# Patient Record
Sex: MALE | Race: WHITE | Employment: FULL TIME | URBAN - METROPOLITAN AREA
[De-identification: names, ages, dates, MRNs, and addresses within clinical notes are randomized per-mention and may not be internally consistent; named-entity substitution may affect disease eponyms.]

---

## 2018-12-18 ENCOUNTER — TELEPHONE (OUTPATIENT)
Dept: OTHER | Facility: CLINIC | Age: 28
End: 2018-12-18

## 2018-12-18 NOTE — TELEPHONE ENCOUNTER
12/18/2018    Call Regarding Onboarding P1 Other    Attempt 1    Message on voicemail     Comments:       Outreach   CWR

## 2019-03-19 ENCOUNTER — HOSPITAL ENCOUNTER (EMERGENCY)
Facility: CLINIC | Age: 29
Discharge: HOME | End: 2019-03-20
Attending: EMERGENCY MEDICINE | Admitting: EMERGENCY MEDICINE
Payer: COMMERCIAL

## 2019-03-19 DIAGNOSIS — R10.13 ABDOMINAL PAIN, EPIGASTRIC: ICD-10-CM

## 2019-03-19 LAB
ALBUMIN SERPL-MCNC: 4 G/DL (ref 3.4–5)
ALP SERPL-CCNC: 73 U/L (ref 40–150)
ALT SERPL W P-5'-P-CCNC: 32 U/L (ref 0–70)
ANION GAP SERPL CALCULATED.3IONS-SCNC: 10 MMOL/L (ref 3–14)
AST SERPL W P-5'-P-CCNC: 34 U/L (ref 0–45)
BILIRUB SERPL-MCNC: 0.4 MG/DL (ref 0.2–1.3)
BUN SERPL-MCNC: 13 MG/DL (ref 7–30)
CALCIUM SERPL-MCNC: 8.4 MG/DL (ref 8.5–10.1)
CHLORIDE SERPL-SCNC: 104 MMOL/L (ref 94–109)
CO2 SERPL-SCNC: 24 MMOL/L (ref 20–32)
CREAT SERPL-MCNC: 0.78 MG/DL (ref 0.66–1.25)
GFR SERPL CREATININE-BSD FRML MDRD: >90 ML/MIN/{1.73_M2}
GLUCOSE SERPL-MCNC: 88 MG/DL (ref 70–99)
LIPASE SERPL-CCNC: 77 U/L (ref 73–393)
POTASSIUM SERPL-SCNC: 3.8 MMOL/L (ref 3.4–5.3)
PROT SERPL-MCNC: 8 G/DL (ref 6.8–8.8)
SODIUM SERPL-SCNC: 138 MMOL/L (ref 133–144)

## 2019-03-19 PROCEDURE — 80053 COMPREHEN METABOLIC PANEL: CPT | Performed by: EMERGENCY MEDICINE

## 2019-03-19 PROCEDURE — 85025 COMPLETE CBC W/AUTO DIFF WBC: CPT | Performed by: EMERGENCY MEDICINE

## 2019-03-19 PROCEDURE — 83690 ASSAY OF LIPASE: CPT | Performed by: EMERGENCY MEDICINE

## 2019-03-19 PROCEDURE — 96374 THER/PROPH/DIAG INJ IV PUSH: CPT | Mod: 59 | Performed by: EMERGENCY MEDICINE

## 2019-03-19 PROCEDURE — 25000132 ZZH RX MED GY IP 250 OP 250 PS 637: Performed by: EMERGENCY MEDICINE

## 2019-03-19 PROCEDURE — 25000128 H RX IP 250 OP 636: Performed by: EMERGENCY MEDICINE

## 2019-03-19 PROCEDURE — 99285 EMERGENCY DEPT VISIT HI MDM: CPT | Mod: Z6 | Performed by: EMERGENCY MEDICINE

## 2019-03-19 PROCEDURE — 99285 EMERGENCY DEPT VISIT HI MDM: CPT | Mod: 25 | Performed by: EMERGENCY MEDICINE

## 2019-03-19 PROCEDURE — 25000125 ZZHC RX 250: Performed by: EMERGENCY MEDICINE

## 2019-03-19 RX ORDER — HYDROMORPHONE HYDROCHLORIDE 1 MG/ML
0.5 INJECTION, SOLUTION INTRAMUSCULAR; INTRAVENOUS; SUBCUTANEOUS
Status: COMPLETED | OUTPATIENT
Start: 2019-03-19 | End: 2019-03-19

## 2019-03-19 RX ORDER — ONDANSETRON 2 MG/ML
4 INJECTION INTRAMUSCULAR; INTRAVENOUS ONCE
Status: COMPLETED | OUTPATIENT
Start: 2019-03-19 | End: 2019-03-19

## 2019-03-19 RX ADMIN — ONDANSETRON 4 MG: 2 INJECTION INTRAMUSCULAR; INTRAVENOUS at 22:47

## 2019-03-19 RX ADMIN — LIDOCAINE HYDROCHLORIDE 30 ML: 20 SOLUTION ORAL; TOPICAL at 22:48

## 2019-03-19 RX ADMIN — Medication 0.5 MG: at 22:47

## 2019-03-19 SDOH — HEALTH STABILITY: MENTAL HEALTH: HOW OFTEN DO YOU HAVE A DRINK CONTAINING ALCOHOL?: NEVER

## 2019-03-19 ASSESSMENT — MIFFLIN-ST. JEOR: SCORE: 2070.76

## 2019-03-19 NOTE — ED AVS SNAPSHOT
[facility], [place], Emergency Department  [address] [place] [id number]  Phone:  [phone]                                    Rajan Schumacher   MRN: [id number]    Department:  [facility], Emergency Department   Date of Visit:  3/19/2019           After Visit Summary Signature Page    I have received my discharge instructions, and my questions have been answered. I have discussed any challenges I see with this plan with the nurse or doctor.    ..........................................................................................................................................  Patient/Patient Representative Signature      ..........................................................................................................................................  Patient Representative Print Name and Relationship to Patient    ..................................................               ................................................  Date                                   Time    ..........................................................................................................................................  Reviewed by Signature/Title    ...................................................              ..............................................  Date                                               Time          22EPIC Rev 08/18

## 2019-03-20 ENCOUNTER — APPOINTMENT (OUTPATIENT)
Dept: CT IMAGING | Facility: CLINIC | Age: 29
End: 2019-03-20
Attending: EMERGENCY MEDICINE
Payer: COMMERCIAL

## 2019-03-20 VITALS
OXYGEN SATURATION: 97 % | RESPIRATION RATE: 18 BRPM | SYSTOLIC BLOOD PRESSURE: 126 MMHG | HEART RATE: 61 BPM | WEIGHT: 240 LBS | HEIGHT: 71 IN | DIASTOLIC BLOOD PRESSURE: 90 MMHG | BODY MASS INDEX: 33.6 KG/M2 | TEMPERATURE: 97.7 F

## 2019-03-20 LAB
ALBUMIN UR-MCNC: NEGATIVE MG/DL
APPEARANCE UR: CLEAR
BASOPHILS # BLD AUTO: 0 10E9/L (ref 0–0.2)
BASOPHILS NFR BLD AUTO: 0.6 %
BILIRUB UR QL STRIP: NEGATIVE
COLOR UR AUTO: ABNORMAL
DIFFERENTIAL METHOD BLD: NORMAL
EOSINOPHIL # BLD AUTO: 0.2 10E9/L (ref 0–0.7)
EOSINOPHIL NFR BLD AUTO: 2.7 %
ERYTHROCYTE [DISTWIDTH] IN BLOOD BY AUTOMATED COUNT: 12 % (ref 10–15)
GLUCOSE UR STRIP-MCNC: NEGATIVE MG/DL
HCT VFR BLD AUTO: 44.3 % (ref 40–53)
HGB BLD-MCNC: 14.4 G/DL (ref 13.3–17.7)
HGB UR QL STRIP: NEGATIVE
IMM GRANULOCYTES # BLD: 0 10E9/L (ref 0–0.4)
IMM GRANULOCYTES NFR BLD: 0.1 %
KETONES UR STRIP-MCNC: 10 MG/DL
LEUKOCYTE ESTERASE UR QL STRIP: NEGATIVE
LYMPHOCYTES # BLD AUTO: 2.6 10E9/L (ref 0.8–5.3)
LYMPHOCYTES NFR BLD AUTO: 36.5 %
MCH RBC QN AUTO: 28.8 PG (ref 26.5–33)
MCHC RBC AUTO-ENTMCNC: 32.5 G/DL (ref 31.5–36.5)
MCV RBC AUTO: 89 FL (ref 78–100)
MONOCYTES # BLD AUTO: 0.7 10E9/L (ref 0–1.3)
MONOCYTES NFR BLD AUTO: 10.1 %
MUCOUS THREADS #/AREA URNS LPF: PRESENT /LPF
NEUTROPHILS # BLD AUTO: 3.6 10E9/L (ref 1.6–8.3)
NEUTROPHILS NFR BLD AUTO: 50 %
NITRATE UR QL: NEGATIVE
NRBC # BLD AUTO: 0 10*3/UL
NRBC BLD AUTO-RTO: 0 /100
PH UR STRIP: 6 PH (ref 5–7)
PLATELET # BLD AUTO: 227 10E9/L (ref 150–450)
RBC # BLD AUTO: 5 10E12/L (ref 4.4–5.9)
RBC #/AREA URNS AUTO: 1 /HPF (ref 0–2)
SOURCE: ABNORMAL
SP GR UR STRIP: 1.05 (ref 1–1.03)
SQUAMOUS #/AREA URNS AUTO: <1 /HPF (ref 0–1)
UROBILINOGEN UR STRIP-MCNC: NORMAL MG/DL (ref 0–2)
WBC # BLD AUTO: 7.1 10E9/L (ref 4–11)
WBC #/AREA URNS AUTO: <1 /HPF (ref 0–5)

## 2019-03-20 PROCEDURE — 74177 CT ABD & PELVIS W/CONTRAST: CPT

## 2019-03-20 PROCEDURE — 25000128 H RX IP 250 OP 636: Performed by: EMERGENCY MEDICINE

## 2019-03-20 PROCEDURE — 81001 URINALYSIS AUTO W/SCOPE: CPT | Performed by: EMERGENCY MEDICINE

## 2019-03-20 PROCEDURE — 96376 TX/PRO/DX INJ SAME DRUG ADON: CPT | Performed by: EMERGENCY MEDICINE

## 2019-03-20 PROCEDURE — 25000132 ZZH RX MED GY IP 250 OP 250 PS 637: Performed by: EMERGENCY MEDICINE

## 2019-03-20 PROCEDURE — 96375 TX/PRO/DX INJ NEW DRUG ADDON: CPT | Performed by: EMERGENCY MEDICINE

## 2019-03-20 PROCEDURE — 96361 HYDRATE IV INFUSION ADD-ON: CPT | Performed by: EMERGENCY MEDICINE

## 2019-03-20 PROCEDURE — 25000125 ZZHC RX 250: Performed by: EMERGENCY MEDICINE

## 2019-03-20 RX ORDER — HYDROMORPHONE HYDROCHLORIDE 1 MG/ML
0.5 INJECTION, SOLUTION INTRAMUSCULAR; INTRAVENOUS; SUBCUTANEOUS
Status: COMPLETED | OUTPATIENT
Start: 2019-03-20 | End: 2019-03-20

## 2019-03-20 RX ORDER — POLYETHYLENE GLYCOL 3350 17 G/17G
1 POWDER, FOR SOLUTION ORAL DAILY
Qty: 527 G | Refills: 0 | Status: SHIPPED | OUTPATIENT
Start: 2019-03-20 | End: 2019-04-03

## 2019-03-20 RX ORDER — OXYCODONE HYDROCHLORIDE 5 MG/1
5 TABLET ORAL ONCE
Status: COMPLETED | OUTPATIENT
Start: 2019-03-20 | End: 2019-03-20

## 2019-03-20 RX ORDER — IOPAMIDOL 755 MG/ML
136 INJECTION, SOLUTION INTRAVASCULAR ONCE
Status: COMPLETED | OUTPATIENT
Start: 2019-03-20 | End: 2019-03-20

## 2019-03-20 RX ADMIN — OXYCODONE HYDROCHLORIDE 5 MG: 5 TABLET ORAL at 02:51

## 2019-03-20 RX ADMIN — IOPAMIDOL 136 ML: 755 INJECTION, SOLUTION INTRAVENOUS at 02:01

## 2019-03-20 RX ADMIN — SODIUM CHLORIDE 1000 ML: 9 INJECTION, SOLUTION INTRAVENOUS at 01:00

## 2019-03-20 RX ADMIN — RANITIDINE HYDROCHLORIDE 50 MG: 25 INJECTION INTRAMUSCULAR; INTRAVENOUS at 02:51

## 2019-03-20 RX ADMIN — Medication 0.5 MG: at 00:37

## 2019-03-20 RX ADMIN — SODIUM CHLORIDE, PRESERVATIVE FREE 88 ML: 5 INJECTION INTRAVENOUS at 02:02

## 2019-03-20 NOTE — ED PROVIDER NOTES
[place] EMERGENCY DEPARTMENT ([facility])  3/19/19   History     Chief Complaint   Patient presents with     Abdominal Pain     The history is provided by the patient.     Rajan Schumacher is a 28 year old otherwise healthy male who presents to the Emergency Department for evaluation of epigastric abdominal pain.  The patient reports that his pain first started with mild cramping approximately 1 week ago and has been intermittent over that week.  Patient notes that he developed diarrhea yesterday and states that it has continued today.  The patient reports that today while at work, he accidentally struck his stomach against something and immediately his epigastric pain became more severe and constant.  He notes that he has also had some mild nausea, but denies any vomiting.  He also denies any urinary symptoms.  He states that he has been eating and drinking normally up until his pain became more severe today.  Patient reports that he only drinks alcohol occasionally and denies any alcohol use within the last few days.  Patient has not taken any medications for his symptoms prior to arrival.  He denies any past abdominal surgeries.    I have reviewed the Medications, Allergies, Past Medical and Surgical History, and Social History in the Epic system.    History reviewed. No pertinent past medical history.    History reviewed. No pertinent surgical history.    History reviewed. No pertinent family history.    Social History     Tobacco Use     Smoking status: Never Smoker     Smokeless tobacco: Never Used   Substance Use Topics     Alcohol use: No     Frequency: Never       Current Facility-Administered Medications   Medication     HYDROmorphone (PF) (DILAUDID) injection 0.5 mg     lidocaine VISCOUS (XYLOCAINE) 2 % 15 mL, alum & mag hydroxide-simethicone (MYLANTA ES/MAALOX  ES) 15 mL GI Cocktail     ondansetron (ZOFRAN) injection 4 mg     No current outpatient medications on file.      No Known  "Allergies      Review of Systems   ROS: 14 point ROS neg other than the symptoms noted above in the HPI.      Physical Exam   BP: (!) 145/92  Pulse: 72  Temp: 97.7  F (36.5  C)  Resp: 18  Height: 180.3 cm (5' 11\")  Weight: 108.9 kg (240 lb)  SpO2: 99 %      Physical Exam   GEN: Well appearing, non toxic, cooperative.   HEENT: The head is normocephalic and atraumatic. Pupils are equal round and reactive to light. Extraocular motions are intact. There is no facial swelling. The neck is nontender and supple.   CV: Regular rate and rhythm without murmurs rubs or gallops. 2+ radial pulses bilaterally.  PULM: Clear to auscultation bilaterally.  ABD: Soft, nontender, nondistended.   EXT: Full range of motion.  No edema.  NEURO: Cranial nerves II through XII are intact and symmetric. Bilateral upper and lower extremities grossly show full range of motion without any focal deficits.   SKIN: No rashes, ecchymosis, or lacerations  PSYCH: Calm and cooperative, interactive.       ED Course   10:16 PM  The patient was seen and examined by Lonny Ford MD in Room ED19.        Procedures             Critical Care time:  none         Results for orders placed or performed during the hospital encounter of 03/19/19   CT Abdomen Pelvis w Contrast    Narrative    EXAMINATION: CT ABDOMEN PELVIS W CONTRAST, 3/20/2019 2:12 AM    TECHNIQUE:  Helical CT images from the lung apices through the  symphysis pubis were obtained with contrast.  Coronal and sagittal  reformatted images were generated at a workstation for further  assessment.    Contrast: 136 ml isovue 370      COMPARISON: None.    HISTORY: Abd pain, gastroenteritis or colitis suspected; epigastric  tenderness    FINDINGS:    Lung bases: No consolidation. No pleural effusion or pneumothorax.    Liver: No suspicious liver lesions. Portal veins appear patent.  Periportal edema likely related to volume resuscitation.  Gallbladder: Tiny layering gallstones in the gallbladder neck as " well  as a tiny stone in the body of the gallbladder. No evidence for acute  cholecystitis. No evidence of acute cholecystitis.  Spleen: Normal size.  Pancreas: No suspicious pancreatic lesions. The pancreatic duct is not  dilated.  Adrenal glands: No adrenal nodules.  Kidneys: No hydronephrosis or obstructing renal stones.  Bladder / Pelvic organs: Unremarkable.  Bowel: No bowel wall thickening. The appendix is unremarkable.  Fecalization of the contents of the terminal ileum without evidence of  obstruction.  Lymph nodes: No retroperitoneal, mesenteric, or pelvic  lymphadenopathy.  Peritoneum / Retroperitoneum: No free fluid or air within the abdomen.  Vessels: No infrarenal aortic aneurysm.     Bones and soft tissues: No suspicious lesion in the bones. Left iliac  and right femoral bone islands.      Impression    IMPRESSION:   1. Fecalization of the contents of the terminal ileum without evidence  of obstruction. Findings may be seen with enteritis or other causes of  delayed bowel transit (small intestinal bacterial overgrowth).  2. Cholelithiasis without evidence of acute cholecystitis.      CBC with platelets differential   Result Value Ref Range    WBC 7.1 4.0 - 11.0 10e9/L    RBC Count 5.00 4.4 - 5.9 10e12/L    Hemoglobin 14.4 13.3 - 17.7 g/dL    Hematocrit 44.3 40.0 - 53.0 %    MCV 89 78 - 100 fl    MCH 28.8 26.5 - 33.0 pg    MCHC 32.5 31.5 - 36.5 g/dL    RDW 12.0 10.0 - 15.0 %    Platelet Count 227 150 - 450 10e9/L    Diff Method Automated Method     % Neutrophils 50.0 %    % Lymphocytes 36.5 %    % Monocytes 10.1 %    % Eosinophils 2.7 %    % Basophils 0.6 %    % Immature Granulocytes 0.1 %    Nucleated RBCs 0 0 /100    Absolute Neutrophil 3.6 1.6 - 8.3 10e9/L    Absolute Lymphocytes 2.6 0.8 - 5.3 10e9/L    Absolute Monocytes 0.7 0.0 - 1.3 10e9/L    Absolute Eosinophils 0.2 0.0 - 0.7 10e9/L    Absolute Basophils 0.0 0.0 - 0.2 10e9/L    Abs Immature Granulocytes 0.0 0 - 0.4 10e9/L    Absolute Nucleated  RBC 0.0    Comprehensive metabolic panel   Result Value Ref Range    Sodium 138 133 - 144 mmol/L    Potassium 3.8 3.4 - 5.3 mmol/L    Chloride 104 94 - 109 mmol/L    Carbon Dioxide 24 20 - 32 mmol/L    Anion Gap 10 3 - 14 mmol/L    Glucose 88 70 - 99 mg/dL    Urea Nitrogen 13 7 - 30 mg/dL    Creatinine 0.78 0.66 - 1.25 mg/dL    GFR Estimate >90 >60 mL/min/[1.73_m2]    GFR Estimate If Black >90 >60 mL/min/[1.73_m2]    Calcium 8.4 (L) 8.5 - 10.1 mg/dL    Bilirubin Total 0.4 0.2 - 1.3 mg/dL    Albumin 4.0 3.4 - 5.0 g/dL    Protein Total 8.0 6.8 - 8.8 g/dL    Alkaline Phosphatase 73 40 - 150 U/L    ALT 32 0 - 70 U/L    AST 34 0 - 45 U/L   Lipase   Result Value Ref Range    Lipase 77 73 - 393 U/L            Labs Ordered and Resulted from Time of ED Arrival Up to the Time of Departure from the ED   COMPREHENSIVE METABOLIC PANEL - Abnormal; Notable for the following components:       Result Value    Calcium 8.4 (*)     All other components within normal limits   ROUTINE UA WITH MICROSCOPIC   CBC WITH PLATELETS DIFFERENTIAL   LIPASE   PERIPHERAL IV CATHETER            Assessments & Plan (with Medical Decision Making)   Patient is a previously healthy 28-year-old male who presents with epigastric pain that is been subtle in nature for about 1 week and acutely worsened today.  Initial vitals were within normal limits, patient afebrile.  Labs were obtained and all within normal limits.  Based on his tenderness in the epigastric region, a CT scan was obtained and concerning for fecalization of the small bowel.  At this time based on patient's reassuring exam, normal laboratory work, and normal vital signs, will treat conservatively with bowel regimen.  Patient was discharged home with MiraLAX and advised to increase the fiber in his diet.  He was also given very strict return precautions for any worsening symptoms to include fevers, chills, nausea or vomiting.  Patient was discharged home in stable condition.    I have reviewed  the nursing notes.    I have reviewed the findings, diagnosis, plan and need for follow up with the patient.       Medication List      Started    polyethylene glycol powder  Commonly known as:  MIRALAX  1 capful, Oral, DAILY            Final diagnoses:   Abdominal pain, epigastric     IKedar, am serving as a trained medical scribe to document services personally performed by Lonny Ford MD, based on the provider's statements to me.   I, Lonny Ford MD, was physically present and have reviewed and verified the accuracy of this note documented by Kedar Hubbard.    3/19/2019   [facility], [place], EMERGENCY DEPARTMENT     Lonny Ford MD  03/20/19 [id number]

## 2019-03-20 NOTE — ED TRIAGE NOTES
Pt arrives with severe upper abdominal pain x5 hours. Pt states he initially just had discomfort but then accidentally hit his stomach and the pain became severe. Denies nausea/vomiting. Reports yellow diarrhea x2 episodes today.

## 2019-04-03 ENCOUNTER — OFFICE VISIT (OUTPATIENT)
Dept: FAMILY MEDICINE | Facility: CLINIC | Age: 29
End: 2019-04-03
Payer: COMMERCIAL

## 2019-04-03 VITALS
WEIGHT: 225 LBS | BODY MASS INDEX: 31.38 KG/M2 | HEART RATE: 83 BPM | OXYGEN SATURATION: 97 % | DIASTOLIC BLOOD PRESSURE: 72 MMHG | TEMPERATURE: 97.5 F | SYSTOLIC BLOOD PRESSURE: 106 MMHG | RESPIRATION RATE: 16 BRPM

## 2019-04-03 DIAGNOSIS — K52.9 ENTERITIS: Primary | ICD-10-CM

## 2019-04-03 PROCEDURE — 99203 OFFICE O/P NEW LOW 30 MIN: CPT | Performed by: PHYSICIAN ASSISTANT

## 2019-04-03 NOTE — PROGRESS NOTES
SUBJECTIVE:   Rajan Schumacher is a 28 year old male who presents to clinic today for the following health issues:      ED/UC Followup:    Facility:  [facility]  Date of visit: 3/19/2019  Reason for visit: Abdominal pain  Current Status: pt is still having this pain       HPI additional notes:   Chief Complaint   Patient presents with     RECHECK     Rajan presents today with ED f/u. Patient seen in ED 3/19/2019 for acute onset epigastric pain. Per visit note, patient was having intermittent epigastric pain x1 wk but patient denies this today. States pain started after he struck himself in the abdomen. Due to fecalization in ileum on CT, was recommended Miralax. Used for several days but didn't see any change in sx. Sx have waxed and waned, but are generally better. Denies f/c/s, n/v/d, constipation, hematochezia or melena, CP, SOB, bloating. Does note intermittent joint pains (migrating - shoulders, wrists, elbows) since ED discharge. Patient reports remote hx UGI 2/2 PUD during elementary school; gets recurrent GERD sx occasionally but nothing recent. States current pain is different from GERD sx. No hx abd surgeries.     ROS:    A Comprehensive greater than 10 system review of systems was carried out.    Pertinent positives and negatives are noted above.  Otherwise negative for contributory information.      Chart Review:  History   Smoking Status     Never Smoker   Smokeless Tobacco     Never Used       There is no problem list on file for this patient.    History reviewed. No pertinent surgical history.  Problem list, Medication list, Allergies, Medical/Social/Surg/Family hx reviewed in EPIC, updated as appropriate.   OBJECTIVE:                                                    /72   Pulse 83   Temp 97.5  F (36.4  C)   Resp 16   Wt 102.1 kg (225 lb)   SpO2 97%   BMI 31.38 kg/m    Body mass index is 31.38 kg/m .  GENERAL:  WDWN, no acute distress  PSYCH: pleasant, cooperative  EYES: no discharge,  no injection  HENT:  Normocephalic. Moist mucus membranes.  NECK:  Supple, symmetric  LUNGS:  Clear to auscultation bilaterally without rhonchi, rales, or wheeze. Chest rise symmetric and no tenderness to palpation.  HEART:  Regular rate & rhythm. No murmur, gallop, or rub.  ABDOMEN:  Soft, non-tender, non-distended. Bowel sounds are present. No palpable masses  EXTREMITIES:  No gross deformities, moves all 4 limbs spontaneously, no peripheral edema  SKIN:  Warm and dry, no rash or suspicious lesions    NEUROLOGIC: alert, sensation grossly intact.    Diagnostic test results: none     ASSESSMENT/PLAN:                                                          ICD-10-CM    1. Enteritis K52.9      Given trauma to abdomen, certainly contusion was contributing to pain but odd it would still be continuing >2 weeks later. Reviewed CT findings with patient and discussed possible motility issue (SIBO vs IBD vs other) causing sx. Discussed watch&wait approach vs GI referral. Since sx overall improving, patient would prefer watch&wait. Ok to use Tylenol prn pain; patient asks about NSAIDs due to arthralgias, may aggravated GERD so would avoid but may try as long as he takes with food. May also use Pepto-Bismol prn. If no improvement or worsening within 3-4 weeks, consider GI referral.    Please see patient instructions for treatment details.    Marguerite Jacobo PA-C  [facility]

## 2020-03-24 ENCOUNTER — VIRTUAL VISIT (OUTPATIENT)
Dept: FAMILY MEDICINE | Facility: OTHER | Age: 30
End: 2020-03-24

## 2020-03-24 NOTE — PROGRESS NOTES
"Date: 2020 14:01:33  Clinician: Ofe Harrington  Clinician NPI: [id number]  Patient: Rajan Schumacher  Patient : 1990  Patient Address: [address] [place], [place] [id number]  Patient Phone: [phone]  Visit Protocol: URI  Patient Summary:  Rajan is a 29 year old ( : 1990 ) male who initiated a Visit for COVID-19 (Coronavirus) evaluation and screening. When asked the question \"Please sign me up to receive news, health information and promotions from [employer].\", Rajan responded \"No\".    Rajan states his symptoms started gradually 3-6 days ago.   His symptoms consist of rhinitis, myalgia, wheezing, a sore throat, a cough, nasal congestion, and malaise. He is experiencing mild difficulty breathing with activities but can speak normally in full sentences.   Symptom details     Nasal secretions: The color of his mucus is clear.    Cough: Rajan coughs every 5-10 minutes and his cough is not more bothersome at night. Phlegm does not come into his throat when he coughs. He does not believe his cough is caused by post-nasal drip.     Sore throat: Rajan reports having mild throat pain (1-3 on a 10 point pain scale), does not have exudate on his tonsils, and can swallow liquids. He is not sure if the lymph nodes in his neck are enlarged. A rash has not appeared on the skin since the sore throat started.     Wheezing: Rajan has not ever been diagnosed with asthma. The wheezing does not interfere with his normal daily activities.     Rajan denies having ear pain, facial pain or pressure, chills, teeth pain, headache, and fever. He also denies double sickening (worsening symptoms after initial improvement), taking antibiotic medication for the symptoms, and having recent facial or sinus surgery in the past 60 days.   Precipitating events  Within the past week, Rajan has not been exposed to someone with strep throat. He has not recently been exposed to someone with influenza. " Rajan has not been in close contact with any high risk individuals.   Pertinent COVID-19 (Coronavirus) information  Rajan has not traveled internationally or to the areas where COVID-19 (Coronavirus) is widespread, including cruise ship travel in the last 14 days before the start of his symptoms.   Rajan has not had a close contact with a laboratory-confirmed COVID-19 patient within 14 days of symptom onset. He also has not had a close contact with a suspected COVID-19 patient within 14 days of symptom onset.   Rajan is not a healthcare worker and does not work in a healthcare facility.   Pertinent medical history  Rajan needs a return to work/school note.   Weight: 235 lbs   Rajan smokes or uses smokeless tobacco.   Weight: 235 lbs    MEDICATIONS: No current medications, ALLERGIES: NKDA  Clinician Response:  Dear Rajan,   Based on the information you have provided, you do have symptoms that are consistent with Coronavirus (COVID-19).  The coronavirus causes mild to severe respiratory illness with the most common symptoms including fever, cough and difficulty breathing. Unfortunately, many viruses cause similar symptoms and it can be difficult to distinguish between viruses, especially in mild cases, so we are presuming that anyone with cough or fever has coronavirus at this time.  Coronavirus/COVID-19 has reached the point of community spread in [place], meaning that we are finding the virus in people with no known exposure risk for tyler the virus. Given the increasing commonness of coronavirus in the community we are no longer testing patients who are not critically ill.  If you are a health care worker, you should refer to your employee health office for instructions about testing and returning to work.  For everyone else who has cough or fever, you should assume you are infected with coronavirus. Since you will not be tested but have symptoms that may be consistent with coronavirus, the  CDC recommends you stay in self-isolation until these three things have happened:    You have had no fever for at least 72 hours (that is three full days of no fever without the use of medicine that reduces fevers)    AND   Other symptoms have improved (for example, when your cough or shortness of breath have improved)   AND   At least 7 days have passed since your symptoms first appeared.   How to Isolate:   Isolate yourself at home.  Do Not allow any visitors  Do Not go to work or school  Do Not go to [employer],  centers, shopping, or other public places.  Do Not shake hands.  Avoid close contact with others (hugging, kissing).   Protect Others:   Cover Your Mouth and Nose with a mask, disposable tissue or wash cloth to avoid spreading germs to others.  Wash your hands and face frequently with soap and water.   We know it can be scary to hear that you might have COVID-19. Our team can help track your symptoms and make sure you are doing ok over the next two weeks using a program called [employer] to keep in touch. When you receive an email from [employer], please consider enrolling in our monitoring program. There is no cost to you for monitoring. Here is a URL where you can learn more: [email or web address]  Managing Symptoms:   At this time, we primarily recommend Tylenol (Acetaminophen) for fever or pain. If you have liver or kidney problems, contact your primary care provider for instructions on use of Tylenol. Adults can take 650 mg (two 325 mg pills) by mouth every 4-6 hours as needed OR 1,000 mg (two 500 mg pills) every 8 hours as needed. MAXIMUM DAILY DOSE: 3,000mg. For children, refer to dosing on bottle based on age or weight.   If you develop significant shortness of breath that prevents you from doing normal activities, please call 911 or proceed to the nearest emergency room and alert them immediately that you have been in self-isolation for possible coronavirus.  For more  information about COVID19 and options for caring for yourself at home, please visit the CDC website at https://www.cdc.gov/coronavirus/2019-ncov/about/steps-when-sick.html. For more options for care at [facility], please visit our website at [email or web address]    Diagnosis: Viral infection, unspecified  Diagnosis ICD: B34.9  Addendum created: March 24 22:56:44, 2020 created by: Glenna Weinberg body: I reviewed the e-visit and discussed the patient with the resident and agree with the resident's assessment and plan of care as documented.

## 2020-08-06 ENCOUNTER — OFFICE VISIT (OUTPATIENT)
Dept: FAMILY MEDICINE | Facility: CLINIC | Age: 30
End: 2020-08-06
Payer: COMMERCIAL

## 2020-08-06 VITALS
WEIGHT: 237 LBS | OXYGEN SATURATION: 98 % | DIASTOLIC BLOOD PRESSURE: 98 MMHG | BODY MASS INDEX: 33.05 KG/M2 | SYSTOLIC BLOOD PRESSURE: 140 MMHG | HEART RATE: 87 BPM | TEMPERATURE: 98.5 F

## 2020-08-06 DIAGNOSIS — B35.1 FUNGAL INFECTION OF TOENAIL: Primary | ICD-10-CM

## 2020-08-06 PROCEDURE — 99213 OFFICE O/P EST LOW 20 MIN: CPT | Performed by: NURSE PRACTITIONER

## 2020-08-06 NOTE — PATIENT INSTRUCTIONS
We will try a topical antifungal    Patient Education     Nail Fungal Infection  A nail fungal infection changes the way fingernails and toenails look. They may thicken, discolor, change shape, or split. This condition is hard to treat because nails grow slowly and have limited blood supply. The infection often comes back after treatment.  There are 2 types of medicines used to treat this condition:    Topical anti-fungal medicines. These are applied to the surface of the skin and nail area. These medicines are not very effective because they can t get deep into the nail.    Oral antifungal medicines. These medicines work better because they go into the nail from the inside out. But the infection may still come back. It may take 9 to 12 months for your nail to look normal again. This means you are cured. You can repeat treatment if needed. Most people take these medicines without any problems. It is rare to stop therapy because of side effects. But your healthcare provider may give you some monitoring tests. Talk about possible side effects with your provider before starting treatment.  If medicines fail, the nail can be removed surgically or chemically. These methods physically remove the fungus from the body. This helps medical treatment be more effective.  Home care    Use medicines exactly as directed for as long as directed. Treating a fungal infection can take longer than other kinds of infections.    Smoking is a risk factor for fungal infection. This is one more reason to quit.    Wear absorbent socks, and shoes that let your feet breathe. Sweaty feet increase your risk of fungal infection. They also make an existing infection harder to treat.    Use footwear when in damp public places like swimming pools, gyms, and shower rooms. This will help you avoid the fungus that grows there.    Don't share nail clippers or scissors with others.  Follow-up care  Follow up with your healthcare provider, or as  advised.  When to seek medical advice  Call your healthcare provider right away if any of these occur:    Skin by the nail becomes red, swollen, painful, or drains pus (a creamy yellow or white liquid)    Side effects from oral anti-fungal medicines  Date Last Reviewed: 8/1/2016 2000-2019 [employer]. [address] [place], [place] [id number]. All rights reserved. This information is not intended as a substitute for professional medical care. Always follow your healthcare professional's instructions.

## 2020-08-06 NOTE — PROGRESS NOTES
SUBJECTIVE:                                                    Rajan Schumacher is a 29 year old male who presents to clinic today for the following health issues:      Toenail fungus x 3 toes right foot x 1 yr has treated with OTC meds not help looking for another option       TOENAIL FUNGUS      Duration: one year    Description  Location: 3 toes on right foot   Itching: no    Intensity:  mild    Accompanying signs and symptoms: None    History (similar episodes/previous evaluation): None    Precipitating or alleviating factors:  New exposures:  None, working a job where feet get sweat a lot   Recent travel: no      Therapies tried and outcome: using OTC cream, antifungal       ELEVATED BP: rare but moving to [place] so stressed lately, moving in couple of weeks       Problems taking medications regularly: No    Medication side effects: none    Diet: regular (no restrictions)    Social History     Tobacco Use     Smoking status: Never Smoker     Smokeless tobacco: Never Used   Substance Use Topics     Alcohol use: No     Frequency: Never        Problem list and histories reviewed & adjusted, as indicated.  Additional history: as documented    There is no problem list on file for this patient.    No past surgical history on file.    Social History     Tobacco Use     Smoking status: Never Smoker     Smokeless tobacco: Never Used   Substance Use Topics     Alcohol use: No     Frequency: Never     No family history on file.        No current outpatient medications on file.     No Known Allergies  Recent Labs   Lab Test 03/19/19  2246   ALT 32   CR 0.78   GFRESTIMATED >90   GFRESTBLACK >90   POTASSIUM 3.8      BP Readings from Last 3 Encounters:   04/03/19 106/72   03/20/19 126/90    Wt Readings from Last 3 Encounters:   04/03/19 102.1 kg (225 lb)   03/19/19 108.9 kg (240 lb)          Labs reviewed in EPIC  Problem list, Medication list, Allergies, and Medical/Social/Surgical histories reviewed in [facility] and  updated as appropriate.        ROS: Constitutional, neuro, ENT, endocrine, pulmonary, cardiac, gastrointestinal, genitourinary, musculoskeletal, integument and psychiatric systems are negative, except as otherwise noted above in the HPI.      OBJECTIVE:                                                    BP (!) 140/98   Pulse 87   Temp 98.5  F (36.9  C) (Oral)   Wt 107.5 kg (237 lb)   SpO2 98%   BMI 33.05 kg/m    There is no height or weight on file to calculate BMI.    GENERAL: healthy, alert and no distress  RESP: lungs clear to auscultation - no rales, rhonchi or wheezes  CV: regular rate and rhythm, normal S1 S2, no S3 or S4, no murmur, click or rub, no peripheral edema   SKIN: 3 toes on right foot, thick nails, cloudy, fungi under nails, mild  ABDOMEN: soft, nontender,  bowel sounds normal  MS: no gross musculoskeletal defects noted, no edema    Mental Status Assessment:  Appearance:   Appropriate   Eye Contact:   Good   Psychomotor Behavior: Normal   Attitude:   Cooperative   Orientation:   All  Speech   Rate / Production: Normal/ Responsive Normal    Volume:  Normal   Mood:    Normal  Affect:    Appropriate   Thought Content:  Clear   Thought Form:  Coherent  Goal Directed  Logical   Insight:    Good   Attention Span and Concentration:  limited  Recent and Remote Memory:  intact  Fund of Knowledge: appropriate  Muscle Strength and Tone: normal   Suicidal Ideation: reports no thoughts, no intention  Hallucination: No  Paranoid-No  Manic-No  Panic-No  Self harm-No      Diagnostic Test Results:  No results found for any visits on 08/06/20.     ASSESSMENT/PLAN:                                                    ASSESSMENT / PLAN:  (B35.1) Fungal infection of toenail  (primary encounter diagnosis)  Comment: chronic for over one year, no improvement with OTC products, getting worse   Plan: Efinaconazole 10 % SOLN        Discussion of risks of oral antifungal, will try a RX topical, discussed use     Patient  Instructions   We will try a topical antifungal    Patient Education     Nail Fungal Infection  A nail fungal infection changes the way fingernails and toenails look. They may thicken, discolor, change shape, or split. This condition is hard to treat because nails grow slowly and have limited blood supply. The infection often comes back after treatment.  There are 2 types of medicines used to treat this condition:    Topical anti-fungal medicines. These are applied to the surface of the skin and nail area. These medicines are not very effective because they can t get deep into the nail.    Oral antifungal medicines. These medicines work better because they go into the nail from the inside out. But the infection may still come back. It may take 9 to 12 months for your nail to look normal again. This means you are cured. You can repeat treatment if needed. Most people take these medicines without any problems. It is rare to stop therapy because of side effects. But your healthcare provider may give you some monitoring tests. Talk about possible side effects with your provider before starting treatment.  If medicines fail, the nail can be removed surgically or chemically. These methods physically remove the fungus from the body. This helps medical treatment be more effective.  Home care    Use medicines exactly as directed for as long as directed. Treating a fungal infection can take longer than other kinds of infections.    Smoking is a risk factor for fungal infection. This is one more reason to quit.    Wear absorbent socks, and shoes that let your feet breathe. Sweaty feet increase your risk of fungal infection. They also make an existing infection harder to treat.    Use footwear when in damp public places like swimming pools, gyms, and shower rooms. This will help you avoid the fungus that grows there.    Don't share nail clippers or scissors with others.  Follow-up care  Follow up with your healthcare provider,  or as advised.  When to seek medical advice  Call your healthcare provider right away if any of these occur:    Skin by the nail becomes red, swollen, painful, or drains pus (a creamy yellow or white liquid)    Side effects from oral anti-fungal medicines  Date Last Reviewed: 8/1/2016 2000-2019 The "[employer]. [address] [place] [address]. All rights reserved. This information is not intended as a substitute for professional medical care. Always follow your healthcare professional's instructions.        Glenna Joseph, BROOKE, APRN [facility]

## 2021-01-10 ENCOUNTER — HEALTH MAINTENANCE LETTER (OUTPATIENT)
Age: 31
End: 2021-01-10

## 2021-10-23 ENCOUNTER — HEALTH MAINTENANCE LETTER (OUTPATIENT)
Age: 31
End: 2021-10-23

## 2022-02-12 ENCOUNTER — HEALTH MAINTENANCE LETTER (OUTPATIENT)
Age: 32
End: 2022-02-12

## 2022-10-10 ENCOUNTER — HEALTH MAINTENANCE LETTER (OUTPATIENT)
Age: 32
End: 2022-10-10

## 2023-02-18 ENCOUNTER — HEALTH MAINTENANCE LETTER (OUTPATIENT)
Age: 33
End: 2023-02-18

## 2024-03-16 ENCOUNTER — HEALTH MAINTENANCE LETTER (OUTPATIENT)
Age: 34
End: 2024-03-16